# Patient Record
Sex: FEMALE | Race: WHITE | NOT HISPANIC OR LATINO | URBAN - NONMETROPOLITAN AREA
[De-identification: names, ages, dates, MRNs, and addresses within clinical notes are randomized per-mention and may not be internally consistent; named-entity substitution may affect disease eponyms.]

---

## 2022-02-21 ENCOUNTER — TELEHEALTH PROVIDED OTHER THAN IN PATIENT'S HOME (OUTPATIENT)
Dept: URBAN - NONMETROPOLITAN AREA CLINIC 9 | Facility: CLINIC | Age: 62
End: 2022-02-21

## 2022-02-21 VITALS
WEIGHT: 189 LBS | DIASTOLIC BLOOD PRESSURE: 69 MMHG | SYSTOLIC BLOOD PRESSURE: 113 MMHG | HEIGHT: 62 IN | RESPIRATION RATE: 17 BRPM | HEART RATE: 64 BPM

## 2022-02-21 DIAGNOSIS — R74.8 ABNORMAL LEVELS OF OTHER SERUM ENZYMES: ICD-10-CM

## 2022-02-21 DIAGNOSIS — I25.10 ATHEROSCLEROTIC HEART DISEASE OF NATIVE CORONARY ARTERY WITH: ICD-10-CM

## 2022-02-21 DIAGNOSIS — R63.4 ABNORMAL WEIGHT LOSS: ICD-10-CM

## 2022-02-21 DIAGNOSIS — R74.01 ELEVATION OF LEVELS OF LIVER TRANSAMINASE LEVELS: ICD-10-CM

## 2022-02-21 DIAGNOSIS — I10 ESSENTIAL (PRIMARY) HYPERTENSION: ICD-10-CM

## 2022-02-21 DIAGNOSIS — E83.52 HYPERCALCEMIA: ICD-10-CM

## 2022-02-21 PROCEDURE — 99203 OFFICE O/P NEW LOW 30 MIN: CPT | Mod: 95

## 2022-02-21 NOTE — SERVICENOTES
This visit was completed via ZOOM due to the restrictions of the COVID-19 pandemic. All issues as below were discussed and addressed.  Patient verbally consented to visit. exam was performed with the assistance Casi Bernardo LPN , who was present in the exam room with patient
Start time:09:50
End time:10:05

## 2022-02-21 NOTE — SERVICEHPINOTES
62-year-old  female with history of coronary artery disease, hypertension, diabetes who comes in for further evaluation of elevated liver function tests, weight loss. She reports a 20-30 lb weight loss over the last 6 months. She notes significant fatigue. She reports early satiety, intermittent dysphagia and a poor appetite. She denies any fever or chills. She reports that she had liver function test elevated in the past and was told it was due to a statin. Her last colonoscopy was over 10 years ago. Review of her labs in epic show a negative hepatitis panel several years ago. Recent labs from her PCP show an AST of 33, , alkaline phosphatase 227, bilirubin 0 work 0.4 protein 7, albumin 3.7, glucose 199. Review of her labs in [facility] showed that 1 year ago she had an AST of 82,  and alkaline phosphatase of 214. She has had intermittent hypercalcemia. She notes a 20-30 lb weight loss over the last several months. She was told in the past that her elevated liver enzymes were due to statin therapy. She had an ultrasound of the abdomen in February 2021 that showed surgically absent gallbladder and pancreatic tail obscured by bowel gas. She has not had any imaging of her abdomen or pelvis since that time she reports difficulty with control of her blood pressure. She is on 4 medications currently. She describes intermittent lightheadedness.